# Patient Record
Sex: MALE | Race: WHITE | Employment: FULL TIME | URBAN - METROPOLITAN AREA
[De-identification: names, ages, dates, MRNs, and addresses within clinical notes are randomized per-mention and may not be internally consistent; named-entity substitution may affect disease eponyms.]

---

## 2020-11-04 ENCOUNTER — HOSPITAL ENCOUNTER (EMERGENCY)
Age: 40
Discharge: HOME | End: 2020-11-04
Attending: EMERGENCY MEDICINE
Payer: COMMERCIAL

## 2020-11-04 ENCOUNTER — APPOINTMENT (OUTPATIENT)
Dept: GENERAL RADIOLOGY | Age: 40
End: 2020-11-04
Payer: COMMERCIAL

## 2020-11-04 VITALS
TEMPERATURE: 97.6 F | OXYGEN SATURATION: 97 % | SYSTOLIC BLOOD PRESSURE: 125 MMHG | HEART RATE: 87 BPM | WEIGHT: 242.29 LBS | HEIGHT: 72 IN | RESPIRATION RATE: 16 BRPM | BODY MASS INDEX: 32.82 KG/M2 | DIASTOLIC BLOOD PRESSURE: 70 MMHG

## 2020-11-04 LAB
A/G RATIO: 1.3 (ref 1.1–2.2)
ALBUMIN SERPL-MCNC: 4.2 G/DL (ref 3.4–5)
ALP BLD-CCNC: 61 U/L (ref 40–129)
ALT SERPL-CCNC: 53 U/L (ref 10–40)
ANION GAP SERPL CALCULATED.3IONS-SCNC: 10 MMOL/L (ref 3–16)
AST SERPL-CCNC: 40 U/L (ref 15–37)
BASOPHILS ABSOLUTE: 0.3 K/UL (ref 0–0.2)
BASOPHILS RELATIVE PERCENT: 3.7 %
BILIRUB SERPL-MCNC: 0.7 MG/DL (ref 0–1)
BUN BLDV-MCNC: 17 MG/DL (ref 7–20)
CALCIUM SERPL-MCNC: 9.4 MG/DL (ref 8.3–10.6)
CHLORIDE BLD-SCNC: 102 MMOL/L (ref 99–110)
CO2: 25 MMOL/L (ref 21–32)
CREAT SERPL-MCNC: 1.4 MG/DL (ref 0.9–1.3)
EOSINOPHILS ABSOLUTE: 0.1 K/UL (ref 0–0.6)
EOSINOPHILS RELATIVE PERCENT: 1.3 %
GFR AFRICAN AMERICAN: >60
GFR NON-AFRICAN AMERICAN: 56
GLOBULIN: 3.2 G/DL
GLUCOSE BLD-MCNC: 113 MG/DL (ref 70–99)
HCT VFR BLD CALC: 46.5 % (ref 40.5–52.5)
HEMOGLOBIN: 15.4 G/DL (ref 13.5–17.5)
LACTIC ACID, SEPSIS: 0.8 MMOL/L (ref 0.4–1.9)
LYMPHOCYTES ABSOLUTE: 1.3 K/UL (ref 1–5.1)
LYMPHOCYTES RELATIVE PERCENT: 15.9 %
MCH RBC QN AUTO: 29.8 PG (ref 26–34)
MCHC RBC AUTO-ENTMCNC: 33 G/DL (ref 31–36)
MCV RBC AUTO: 90.4 FL (ref 80–100)
MONOCYTES ABSOLUTE: 0.8 K/UL (ref 0–1.3)
MONOCYTES RELATIVE PERCENT: 10.2 %
NEUTROPHILS ABSOLUTE: 5.7 K/UL (ref 1.7–7.7)
NEUTROPHILS RELATIVE PERCENT: 68.9 %
PDW BLD-RTO: 12.2 % (ref 12.4–15.4)
PLATELET # BLD: 203 K/UL (ref 135–450)
PMV BLD AUTO: 7.9 FL (ref 5–10.5)
POTASSIUM SERPL-SCNC: 4.7 MMOL/L (ref 3.5–5.1)
RBC # BLD: 5.15 M/UL (ref 4.2–5.9)
SODIUM BLD-SCNC: 137 MMOL/L (ref 136–145)
TOTAL PROTEIN: 7.4 G/DL (ref 6.4–8.2)
WBC # BLD: 8.2 K/UL (ref 4–11)

## 2020-11-04 PROCEDURE — 87040 BLOOD CULTURE FOR BACTERIA: CPT

## 2020-11-04 PROCEDURE — 83605 ASSAY OF LACTIC ACID: CPT

## 2020-11-04 PROCEDURE — 96365 THER/PROPH/DIAG IV INF INIT: CPT

## 2020-11-04 PROCEDURE — 2500000003 HC RX 250 WO HCPCS: Performed by: EMERGENCY MEDICINE

## 2020-11-04 PROCEDURE — 80053 COMPREHEN METABOLIC PANEL: CPT

## 2020-11-04 PROCEDURE — 73130 X-RAY EXAM OF HAND: CPT

## 2020-11-04 PROCEDURE — 85025 COMPLETE CBC W/AUTO DIFF WBC: CPT

## 2020-11-04 PROCEDURE — 6370000000 HC RX 637 (ALT 250 FOR IP): Performed by: EMERGENCY MEDICINE

## 2020-11-04 PROCEDURE — 90471 IMMUNIZATION ADMIN: CPT | Performed by: EMERGENCY MEDICINE

## 2020-11-04 PROCEDURE — 99285 EMERGENCY DEPT VISIT HI MDM: CPT

## 2020-11-04 PROCEDURE — 36415 COLL VENOUS BLD VENIPUNCTURE: CPT

## 2020-11-04 PROCEDURE — 90715 TDAP VACCINE 7 YRS/> IM: CPT | Performed by: EMERGENCY MEDICINE

## 2020-11-04 PROCEDURE — 6360000002 HC RX W HCPCS: Performed by: EMERGENCY MEDICINE

## 2020-11-04 RX ORDER — CEPHALEXIN 500 MG/1
1000 CAPSULE ORAL ONCE
Status: COMPLETED | OUTPATIENT
Start: 2020-11-04 | End: 2020-11-04

## 2020-11-04 RX ORDER — SULFAMETHOXAZOLE AND TRIMETHOPRIM 800; 160 MG/1; MG/1
2 TABLET ORAL ONCE
Status: COMPLETED | OUTPATIENT
Start: 2020-11-04 | End: 2020-11-04

## 2020-11-04 RX ORDER — SULFAMETHOXAZOLE AND TRIMETHOPRIM 800; 160 MG/1; MG/1
2 TABLET ORAL 2 TIMES DAILY
Qty: 40 TABLET | Refills: 0 | Status: SHIPPED | OUTPATIENT
Start: 2020-11-04 | End: 2020-11-14

## 2020-11-04 RX ORDER — CLINDAMYCIN PHOSPHATE 600 MG/50ML
600 INJECTION INTRAVENOUS ONCE
Status: COMPLETED | OUTPATIENT
Start: 2020-11-04 | End: 2020-11-04

## 2020-11-04 RX ORDER — ACETAMINOPHEN 500 MG
1000 TABLET ORAL ONCE
Status: COMPLETED | OUTPATIENT
Start: 2020-11-04 | End: 2020-11-04

## 2020-11-04 RX ORDER — CEPHALEXIN 500 MG/1
500 CAPSULE ORAL 4 TIMES DAILY
Qty: 40 CAPSULE | Refills: 0 | Status: SHIPPED | OUTPATIENT
Start: 2020-11-04

## 2020-11-04 RX ADMIN — CEPHALEXIN 1000 MG: 500 CAPSULE ORAL at 21:19

## 2020-11-04 RX ADMIN — ACETAMINOPHEN 1000 MG: 500 TABLET ORAL at 21:01

## 2020-11-04 RX ADMIN — TETANUS TOXOID, REDUCED DIPHTHERIA TOXOID AND ACELLULAR PERTUSSIS VACCINE, ADSORBED 0.5 ML: 5; 2.5; 8; 8; 2.5 SUSPENSION INTRAMUSCULAR at 21:03

## 2020-11-04 RX ADMIN — CLINDAMYCIN IN 5 PERCENT DEXTROSE 600 MG: 12 INJECTION, SOLUTION INTRAVENOUS at 21:09

## 2020-11-04 RX ADMIN — SULFAMETHOXAZOLE AND TRIMETHOPRIM 2 TABLET: 800; 160 TABLET ORAL at 21:19

## 2020-11-04 ASSESSMENT — PAIN - FUNCTIONAL ASSESSMENT: PAIN_FUNCTIONAL_ASSESSMENT: 0-10

## 2020-11-04 ASSESSMENT — PAIN DESCRIPTION - PAIN TYPE
TYPE: ACUTE PAIN
TYPE: CHRONIC PAIN
TYPE: ACUTE PAIN

## 2020-11-04 ASSESSMENT — PAIN DESCRIPTION - LOCATION: LOCATION: HAND

## 2020-11-04 ASSESSMENT — PAIN DESCRIPTION - ORIENTATION
ORIENTATION: RIGHT

## 2020-11-04 ASSESSMENT — PAIN SCALES - GENERAL
PAINLEVEL_OUTOF10: 2

## 2020-11-04 ASSESSMENT — PAIN DESCRIPTION - DESCRIPTORS
DESCRIPTORS: ACHING
DESCRIPTORS: ACHING

## 2020-11-04 ASSESSMENT — PAIN DESCRIPTION - FREQUENCY: FREQUENCY: CONTINUOUS

## 2020-11-04 ASSESSMENT — PAIN DESCRIPTION - DIRECTION: RADIATING_TOWARDS: RIGHT MIDDLE FINGER

## 2020-11-05 NOTE — ED NOTES
Gave patient discharge instructions. He states,understanding.  Patient discharged to home      Naeem Chan RN  11/04/20 2985

## 2020-11-05 NOTE — ED NOTES
1 blood culture obtained by Nicole Esteban     2 nd blood culture obtained by Elenita Del Castillo RN  11/04/20 3089

## 2020-11-05 NOTE — ED PROVIDER NOTES
[facility]  eMERGENCY dEPARTMENT eNCOUnter      Pt Name: Anise Phalen  MRN: [id number]  [place] 1980  Date of evaluation: 11/4/2020  Provider: Richardson Grimaldo MD    CHIEF COMPLAINT       Chief Complaint   Patient presents with    Other     patient states right middle finger swollen on Saturday. He stuck a pin in finger to drain small amont of pus out of finger. He states, he has been squeezing on it everyday trying to drain any pus out of it. Today he reports red arm going up his right arm          CRITICAL CARE TIME   Total Critical Care time was 0 minutes, excluding separately reportable procedures. There was a high probability of clinically significant/life threatening deterioration in the patient's condition which required my urgent intervention. HISTORY OF PRESENT ILLNESS  (Location/Symptom, Timing/Onset, Context/Setting, Quality, Duration, Modifying Factors, Severity.)   Anise Phalen is a 41 y.o. male who presents to the emergency department complaining of pain and swelling in his right middle finger with some redness in his right arm. About 3 days ago he noticed what looked like a small pustule on his right middle finger. He states he took a needle and popped it. He had some drainage. He states today he noticed some redness in his right arm and thought it might still be related. He said he still has some swelling in his right middle finger. 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 He does not remember injuring himself. He does not remember getting any foreign body or laceration. Nursing Notes were reviewed and I agree. REVIEW OF SYSTEMS    (2-9 systems for level 4, 10 or more for level 5)     Dental: No fever or chills. Cardiovascular: No chest pain. Pulmonary: No shortness of breath. GI: No abdominal pain nausea vomiting.   Musculoskeletal: Swelling in his right middle finger, pustule that he popped with a needle several days ago. Some redness in his right upper arm and forearm area. Skin: As above. Except as noted above the remainder of the review of systems was reviewed and negative. PAST MEDICAL HISTORY   History reviewed. No pertinent past medical history. SURGICAL HISTORY       Past Surgical History:   Procedure Laterality Date    WISDOM TOOTH EXTRACTION           CURRENT MEDICATIONS       Previous Medications    No medications on file       ALLERGIES     Patient has no known allergies. FAMILY HISTORY     History reviewed. No pertinent family history.        SOCIAL HISTORY       Social History     Socioeconomic History    Marital status:      Spouse name: None    Number of children: None    Years of education: None    Highest education level: None   Occupational History    None   Social Needs    Financial resource strain: None    Food insecurity     Worry: None     Inability: None    Transportation needs     Medical: None     Non-medical: None   Tobacco Use    Smoking status: Never Smoker    Smokeless tobacco: Never Used   Substance and Sexual Activity    Alcohol use: No    Drug use: No    Sexual activity: Yes     Partners: Female   Lifestyle    Physical activity     Days per week: None     Minutes per session: None    Stress: None   Relationships    Social connections     Talks on phone: None     Gets together: None     Attends Yarsanism service: None     Active member of club or organization: None     Attends meetings of clubs or organizations: None     Relationship status: None    Intimate partner violence     Fear of current or ex partner: None     Emotionally abused: None     Physically abused: None     Forced sexual activity: None   Other Topics Concern    None   Social History Narrative    None         PHYSICAL EXAM    (up to 7 for level 4, 8 or more for level 5)     ED Triage Vitals   BP Temp Temp src Pulse Resp SpO2 Height Weight   -- -- -- -- -- -- -- -- General: Alert white male no acute distress. Head: Atraumatic and normocephalic. Eyes: No conjunctival injection. Pupils equal round reactive. ENT: To Curls is clear. Oropharynx is moist without erythema. Neck: Supple without adenopathy, nontender. Heart: Regular rate and rhythm. No murmurs or gallops noted. Lungs: Breath sounds equal bilaterally and clear. Abdomen: Soft, nondistended, nontender. No masses organomegaly. Musculoskeletal: There are some mild diffuse swelling of the proximal and middle phalanx of the right middle finger primarily on the palm side. The skin is diffusely dry and cracked over his entire hand. There is no pustule. No fluctuance. No significant tenderness along the flexor tendon into the palm. He has full range of motion of the digits including intact flexion and extension with no significant pain. Some lymphangitis on his forearm and biceps area. Full range of motion of his elbow and wrist.  Intact distal pulses and capillary refill. Neuro: Awake, alert, oriented. Symmetrical reactive pupils. Intact extraocular movements. No facial asymmetry. Symmetrical motor function. Normal gait. DIFFERENTIAL DIAGNOSIS   Differential includes but is not limited to finger abscess, cellulitis, tenosynovitis, lymphangitis, sepsis      DIAGNOSTIC RESULTS     EKG: All EKG's are interpreted by Ofe Delong MD in the absence of a cardiologist.      RADIOLOGY:   Non-plain film images such as CT, Ultrasound and MRI are read by the radiologist. Plain radiographic images are visualized and preliminarily interpreted Ofe Delong MD with the below findings:      Interpretation per the Radiologist below, if available at the time of this note:    XR HAND RIGHT (MIN 3 VIEWS)   Final Result   Right middle finger soft tissue swelling, likely cellulitis. No evidence of   osteomyelitis. No evidence of a radiopaque foreign body.                ED BEDSIDE ULTRASOUND:   Performed by ED Physician - none    LABS:  Labs Reviewed   CBC WITH AUTO DIFFERENTIAL - Abnormal; Notable for the following components:       Result Value    RDW 12.2 (*)     Basophils Absolute 0.3 (*)     All other components within normal limits    Narrative:     Performed at:  [facility]) [facility]  [address]   Phone [phone]   COMPREHENSIVE METABOLIC PANEL - Abnormal; Notable for the following components:    Glucose 113 (*)     CREATININE 1.4 (*)     GFR Non- 56 (*)     ALT 53 (*)     AST 40 (*)     All other components within normal limits    Narrative:     Performed at:  [place] [id number] [facility]   Phone [phone]   CULTURE, BLOOD 1   CULTURE, BLOOD 2   LACTATE, SEPSIS    Narrative:     Performed at:  [place] [id number] [facility]   Phone [phone]       All other labs were within normal range or not returned as of this dictation. EMERGENCY DEPARTMENT COURSE and DIFFERENTIAL DIAGNOSIS/MDM:   Vitals:    Vitals:    11/04/20 2001   BP: 131/76   Pulse: 92   Resp: 16   Temp: 101.5 °F (38.6 °C)   TempSrc: Oral   SpO2: 95%   Weight: 242 lb 4.6 oz (109.9 kg)   Height: 6' (1.829 m)       This patient reportedly had an area on his right middle finger that he punctured with a needle to drain some purulent material out of it. Since then he has had some continued swelling. He is developed some redness in his right forearm and upper arm. His swelling in his finger is primarily in area of the proximal middle phalanx, it somewhat diffuse. He has no significant tenderness along the necks or extensor tendon. He moves the finger well, he clinically does not have a tenosynovitis. The tenderness is not significant along the flexor or extensor tendons.   He has some lymphangitis in his forearm and bicep area is seen in the pictures. He was febrile at 101. White blood cell count is not significantly elevated, no significant shift. His lactic acid is not elevated. Blood cultures were obtained. He was given a dose of IV clindamycin. Clinically I suspect this is cellulitis at this point in time. I am going to put him on Bactrim and Keflex. I am going to have him follow-up with hand surgery. We discussed the possibility of worsening infection including complications like tenosynovitis. We discussed returning for persistent fever, increased redness, swelling, pain. We discussed the fact that if he is not improved or has continued fever in 48 to 72 hours he should return here so that I can reevaluate and decide if he needs inpatient treatment. CONSULTS:  None    PROCEDURES:  None    FINAL IMPRESSION      1.  Cellulitis of finger of right hand          DISPOSITION/PLAN   DISPOSITION        PATIENT REFERRED TO:  Ginna Mc MD  [address] [facility] [address] [facility]  [phone]    In 5 days        DISCHARGE MEDICATIONS:  New Prescriptions    CEPHALEXIN (KEFLEX) 500 MG CAPSULE    Take 1 capsule by mouth 4 times daily    SULFAMETHOXAZOLE-TRIMETHOPRIM (BACTRIM DS) 800-160 MG PER TABLET    Take 2 tablets by mouth 2 times daily for 10 days       (Please note that portions of this note were completed with a voice recognition program.  Efforts were made to edit the dictations but occasionally words are mis-transcribed.)    Josey Lovelace MD  Attending Emergency Physician        Josue Gomez MD  11/04/20 6433

## 2020-11-09 LAB
BLOOD CULTURE, ROUTINE: NORMAL
CULTURE, BLOOD 2: NORMAL

## 2022-12-13 ENCOUNTER — OFFICE VISIT (OUTPATIENT)
Dept: SURGERY | Age: 42
End: 2022-12-13
Payer: COMMERCIAL

## 2022-12-13 VITALS — DIASTOLIC BLOOD PRESSURE: 80 MMHG | SYSTOLIC BLOOD PRESSURE: 110 MMHG | BODY MASS INDEX: 32.86 KG/M2 | HEIGHT: 72 IN

## 2022-12-13 DIAGNOSIS — K40.90 LEFT INGUINAL HERNIA: Primary | ICD-10-CM

## 2022-12-13 PROCEDURE — G8484 FLU IMMUNIZE NO ADMIN: HCPCS | Performed by: SURGERY

## 2022-12-13 PROCEDURE — G8417 CALC BMI ABV UP PARAM F/U: HCPCS | Performed by: SURGERY

## 2022-12-13 PROCEDURE — G8427 DOCREV CUR MEDS BY ELIG CLIN: HCPCS | Performed by: SURGERY

## 2022-12-13 PROCEDURE — 99204 OFFICE O/P NEW MOD 45 MIN: CPT | Performed by: SURGERY

## 2022-12-13 PROCEDURE — 4004F PT TOBACCO SCREEN RCVD TLK: CPT | Performed by: SURGERY

## 2022-12-13 ASSESSMENT — ENCOUNTER SYMPTOMS
ABDOMINAL PAIN: 1
ABDOMINAL DISTENTION: 1

## 2022-12-13 NOTE — PATIENT INSTRUCTIONS
Hernia repair scheduled for 1/18/23 at 10:20 arrive at 8:50. Nothing to eat or drink after midnight. You will need someone to bring you home.

## 2022-12-13 NOTE — PROGRESS NOTES
Christy Thorne (:  1980) is a 38 y.o. male,New patient, here for evaluation of the following chief complaint(s):  Surgical Consult (Pt is here today, referred by a friend, for a possible left inguinal hernia. )         ASSESSMENT/PLAN:  1. Left inguinal hernia    Repair    The risks, benefits and alternatives to the planned procedure were discussed. Patient expressed an understanding and is willing to proceed. Subjective   SUBJECTIVE/OBJECTIVE:  HPI  Chief Complaint: hernia    Patient presents for evaluation of a hernia. Patient reports symptoms of bulging with exertion, occasional pain and a \"gurgling sound\" at times. Location of symptoms is left groin. Symptoms were first noted a few years ago but have worsened over the past few months. Patient has a history of no other surgical issues. Will plan following treatment: repair in January.       PMH: covid in     Past Surgical History:   Procedure Laterality Date    WISDOM TOOTH EXTRACTION         Medication: none    Prior to Admission medications    Not on File         No Known Allergies    Social History     Socioeconomic History    Marital status:      Spouse name: Not on file    Number of children: Not on file    Years of education: Not on file    Highest education level: Not on file   Occupational History    Not on file   Tobacco Use    Smoking status: Never    Smokeless tobacco: Never   Substance and Sexual Activity    Alcohol use: No    Drug use: No    Sexual activity: Yes     Partners: Female   Other Topics Concern    Not on file   Social History Narrative    Not on file     Social Determinants of Health     Financial Resource Strain: Not on file   Food Insecurity: Not on file   Transportation Needs: Not on file   Physical Activity: Not on file   Stress: Not on file   Social Connections: Not on file   Intimate Partner Violence: Not on file   Housing Stability: Not on file       Family History: reviewed and not contributory to current medical issues      Review of Systems   Gastrointestinal:  Positive for abdominal distention and abdominal pain. All other systems reviewed and are negative. Objective   Physical Exam  Constitutional:       General: He is not in acute distress. Appearance: He is well-developed. He is not toxic-appearing. HENT:      Head: Normocephalic and atraumatic. Neck:      Thyroid: No thyromegaly. Trachea: No tracheal deviation. Cardiovascular:      Heart sounds: Normal heart sounds. No murmur heard. Pulmonary:      Effort: Pulmonary effort is normal. No respiratory distress. Breath sounds: Normal breath sounds. Abdominal:      General: There is no distension. Palpations: Abdomen is soft. Tenderness: There is no abdominal tenderness. There is no guarding. Hernia: A hernia (left groin) is present. Musculoskeletal:         General: No tenderness. Normal range of motion. Cervical back: Neck supple. Skin:     General: Skin is warm and dry. Neurological:      Mental Status: He is alert and oriented to person, place, and time. Cranial Nerves: No cranial nerve deficit. Psychiatric:         Behavior: Behavior normal.         Thought Content: Thought content normal.   Vitals    Last recorded: 12/13 1133     BP:  110/80     Height:  6' (1.829 m)            Electronically signed by Trish Snider MD on 12/13/2022 at 11:57 AM        An electronic signature was used to authenticate this note.     --Trish Snider MD

## 2023-01-10 NOTE — PROGRESS NOTES
C-diff Questionnaire:     * Admitted with diarrhea? [] YES    [x]  NO     *Prior history of C-Diff. In last 3 months? [] YES    [x]  NO     *Antibiotic use in the past 6-8 weeks? [x]  NO    []  YES      If yes, which: REASON_________________     *Prior hospitalization or nursing home in the last month? []  YES    [x]  NO     SAFETY FIRST. .call before you fall    [address] time__845       Surgery time__1020    Do not eat or drink anything after 12:00 midnight prior to your surgery. This includes water chewing gum, mints and ice chips- the Day of Surgery. You may brush your teeth and gargle the morning of your surgery, but do not swallow the water     Please see your family doctor/pediatrician for a history and physical and/or questions concerning medications. Bring any test results/reports from your physicians office. If you are under the care of a heart doctor or specialist doctor, please be aware that you may be asked to them for clearance    You may be asked to stop blood thinners such as Coumadin, Plavix, Fragmin, Lovenox, etc., or any anti-inflammatories such as:  Aspirin, Ibuprofen, Advil, Naproxen prior to your surgery. We also ask that you stop any OTC medications such as fish oil, vitamin E, glucosamine, garlic, Multivitamins, COQ 10, etc.    We ask that you do not smoke 24 hours prior to surgery  We ask that you do not  drink any alcoholic beverages 24 hours prior to surgery     You must make arrangements for a responsible adult to take you home after your surgery. For your safety you will not be allowed to leave alone or drive yourself home. Your surgery will be cancelled if you do not have a ride home. Also for your safety, it is strongly suggested that someone stay with you the first 24 hours after your surgery.      A parent or legal guardian must accompany a child scheduled for surgery and plan to stay at the hospital until the child is discharged. Please do not bring other children with you. For your comfort, please wear simple loose fitting clothing to the hospital.  Please do not bring valuables. Do not wear any make-up or nail polish on your fingers or toes. For your safety, please do not wear any jewelry or body piercing's on the day of surgery. All jewelry must be removed. If you have dentures, they will be removed before going to operating room. For your convenience, we will provide you with a container. If you wear contact lenses or glasses, they will be removed, please bring a case for them. If you have a living will and a durable power of  for healthcare, please bring in a copy. As part of our patient safety program to minimize surgical site infections, we ask you to do the following:    Please notify your surgeon if you develop any illness between         now and the day of your surgery. This includes a cough, cold, fever, sore throat, nausea,         or vomiting, and diarrhea, etc.   Please notify your surgeon if you experience dizziness, shortness         of breath or blurred vision between now and the time of your surgery. Do not shave your operative site 96 hours prior to surgery. For face and neck surgery, men may use an electric razor 48 hours   prior to surgery. You may shower the night before surgery or the morning of   your surgery with an antibacterial soap. You will need to bring a photo ID and insurance card     If you use a C-pap or Bi-pap machine, please bring your machine with you to the hospital     Our goal is to provide you with excellent care, therefore, visitors will be limited to so that we may focus on providing this care for you. Please contact your surgeon office, if you have any further questions.                  [facility] phone number:  [address] PAT fax number:  [phone]    Please note these are generalized instructions for all surgical cases, you may be provided with more specific instructions according to your surgery.

## 2023-01-17 ENCOUNTER — ANESTHESIA EVENT (OUTPATIENT)
Dept: OPERATING ROOM | Age: 43
End: 2023-01-17
Payer: COMMERCIAL

## 2023-01-18 ENCOUNTER — ANESTHESIA (OUTPATIENT)
Dept: OPERATING ROOM | Age: 43
End: 2023-01-18
Payer: COMMERCIAL

## 2023-01-18 ENCOUNTER — HOSPITAL ENCOUNTER (OUTPATIENT)
Age: 43
Setting detail: OUTPATIENT SURGERY
Discharge: HOME | End: 2023-01-18
Attending: SURGERY | Admitting: SURGERY
Payer: COMMERCIAL

## 2023-01-18 VITALS
WEIGHT: 242 LBS | HEIGHT: 72 IN | SYSTOLIC BLOOD PRESSURE: 126 MMHG | DIASTOLIC BLOOD PRESSURE: 79 MMHG | OXYGEN SATURATION: 96 % | TEMPERATURE: 97.6 F | BODY MASS INDEX: 32.78 KG/M2 | HEART RATE: 66 BPM | RESPIRATION RATE: 18 BRPM

## 2023-01-18 DIAGNOSIS — K40.90 UNILATERAL INGUINAL HERNIA WITHOUT OBSTRUCTION OR GANGRENE, RECURRENCE NOT SPECIFIED: ICD-10-CM

## 2023-01-18 PROCEDURE — 2709999900 HC NON-CHARGEABLE SUPPLY: Performed by: SURGERY

## 2023-01-18 PROCEDURE — 3600000013 HC SURGERY LEVEL 3 ADDTL 15MIN: Performed by: SURGERY

## 2023-01-18 PROCEDURE — C1781 MESH (IMPLANTABLE): HCPCS | Performed by: SURGERY

## 2023-01-18 PROCEDURE — 6360000002 HC RX W HCPCS: Performed by: NURSE ANESTHETIST, CERTIFIED REGISTERED

## 2023-01-18 PROCEDURE — 2580000003 HC RX 258: Performed by: ANESTHESIOLOGY

## 2023-01-18 PROCEDURE — 7100000001 HC PACU RECOVERY - ADDTL 15 MIN: Performed by: SURGERY

## 2023-01-18 PROCEDURE — 49505 PRP I/HERN INIT REDUC >5 YR: CPT | Performed by: SURGERY

## 2023-01-18 PROCEDURE — 6360000002 HC RX W HCPCS: Performed by: SURGERY

## 2023-01-18 PROCEDURE — 2580000003 HC RX 258: Performed by: SURGERY

## 2023-01-18 PROCEDURE — A4217 STERILE WATER/SALINE, 500 ML: HCPCS | Performed by: SURGERY

## 2023-01-18 PROCEDURE — 2500000003 HC RX 250 WO HCPCS: Performed by: SURGERY

## 2023-01-18 PROCEDURE — 7100000000 HC PACU RECOVERY - FIRST 15 MIN: Performed by: SURGERY

## 2023-01-18 PROCEDURE — C2626 INFUSION PUMP, NON-PROG,TEMP: HCPCS | Performed by: SURGERY

## 2023-01-18 PROCEDURE — 7100000011 HC PHASE II RECOVERY - ADDTL 15 MIN: Performed by: SURGERY

## 2023-01-18 PROCEDURE — 3600000003 HC SURGERY LEVEL 3 BASE: Performed by: SURGERY

## 2023-01-18 PROCEDURE — 7100000010 HC PHASE II RECOVERY - FIRST 15 MIN: Performed by: SURGERY

## 2023-01-18 PROCEDURE — 2500000003 HC RX 250 WO HCPCS: Performed by: NURSE ANESTHETIST, CERTIFIED REGISTERED

## 2023-01-18 PROCEDURE — 3700000001 HC ADD 15 MINUTES (ANESTHESIA): Performed by: SURGERY

## 2023-01-18 PROCEDURE — 3700000000 HC ANESTHESIA ATTENDED CARE: Performed by: SURGERY

## 2023-01-18 PROCEDURE — 6370000000 HC RX 637 (ALT 250 FOR IP): Performed by: ANESTHESIOLOGY

## 2023-01-18 DEVICE — MESH HERN W3XL6IN INGUINAL POLYPR MFIL RECTANG: Type: IMPLANTABLE DEVICE | Site: GROIN | Status: FUNCTIONAL

## 2023-01-18 RX ORDER — FENTANYL CITRATE 50 UG/ML
25 INJECTION, SOLUTION INTRAMUSCULAR; INTRAVENOUS EVERY 5 MIN PRN
Status: DISCONTINUED | OUTPATIENT
Start: 2023-01-18 | End: 2023-01-18 | Stop reason: HOSPADM

## 2023-01-18 RX ORDER — ONDANSETRON 2 MG/ML
INJECTION INTRAMUSCULAR; INTRAVENOUS PRN
Status: DISCONTINUED | OUTPATIENT
Start: 2023-01-18 | End: 2023-01-18 | Stop reason: SDUPTHER

## 2023-01-18 RX ORDER — SODIUM CHLORIDE 0.9 % (FLUSH) 0.9 %
5-40 SYRINGE (ML) INJECTION PRN
Status: DISCONTINUED | OUTPATIENT
Start: 2023-01-18 | End: 2023-01-18 | Stop reason: HOSPADM

## 2023-01-18 RX ORDER — NAPROXEN 500 MG/1
500 TABLET ORAL 2 TIMES DAILY WITH MEALS
Qty: 30 TABLET | Refills: 0 | Status: SHIPPED | OUTPATIENT
Start: 2023-01-18

## 2023-01-18 RX ORDER — FENTANYL CITRATE 50 UG/ML
INJECTION, SOLUTION INTRAMUSCULAR; INTRAVENOUS PRN
Status: DISCONTINUED | OUTPATIENT
Start: 2023-01-18 | End: 2023-01-18 | Stop reason: SDUPTHER

## 2023-01-18 RX ORDER — MAGNESIUM HYDROXIDE 1200 MG/15ML
LIQUID ORAL CONTINUOUS PRN
Status: DISCONTINUED | OUTPATIENT
Start: 2023-01-18 | End: 2023-01-18 | Stop reason: HOSPADM

## 2023-01-18 RX ORDER — KETAMINE HCL IN NACL, ISO-OSM 100MG/10ML
SYRINGE (ML) INJECTION PRN
Status: DISCONTINUED | OUTPATIENT
Start: 2023-01-18 | End: 2023-01-18 | Stop reason: SDUPTHER

## 2023-01-18 RX ORDER — PROPOFOL 10 MG/ML
INJECTION, EMULSION INTRAVENOUS CONTINUOUS PRN
Status: DISCONTINUED | OUTPATIENT
Start: 2023-01-18 | End: 2023-01-18 | Stop reason: SDUPTHER

## 2023-01-18 RX ORDER — HYDROCODONE BITARTRATE AND ACETAMINOPHEN 5; 325 MG/1; MG/1
1 TABLET ORAL EVERY 6 HOURS PRN
Qty: 28 TABLET | Refills: 0 | Status: SHIPPED | OUTPATIENT
Start: 2023-01-18 | End: 2023-01-23

## 2023-01-18 RX ORDER — HYDROCODONE BITARTRATE AND ACETAMINOPHEN 5; 325 MG/1; MG/1
1 TABLET ORAL
Status: COMPLETED | OUTPATIENT
Start: 2023-01-18 | End: 2023-01-18

## 2023-01-18 RX ORDER — BUPIVACAINE HYDROCHLORIDE 5 MG/ML
INJECTION, SOLUTION EPIDURAL; INTRACAUDAL
Status: COMPLETED | OUTPATIENT
Start: 2023-01-18 | End: 2023-01-18

## 2023-01-18 RX ORDER — SODIUM CHLORIDE 0.9 % (FLUSH) 0.9 %
5-40 SYRINGE (ML) INJECTION EVERY 12 HOURS SCHEDULED
Status: DISCONTINUED | OUTPATIENT
Start: 2023-01-18 | End: 2023-01-18 | Stop reason: HOSPADM

## 2023-01-18 RX ORDER — SODIUM CHLORIDE 9 MG/ML
INJECTION, SOLUTION INTRAVENOUS PRN
Status: DISCONTINUED | OUTPATIENT
Start: 2023-01-18 | End: 2023-01-18 | Stop reason: HOSPADM

## 2023-01-18 RX ORDER — PROPOFOL 10 MG/ML
INJECTION, EMULSION INTRAVENOUS PRN
Status: DISCONTINUED | OUTPATIENT
Start: 2023-01-18 | End: 2023-01-18 | Stop reason: SDUPTHER

## 2023-01-18 RX ORDER — ONDANSETRON 2 MG/ML
4 INJECTION INTRAMUSCULAR; INTRAVENOUS
Status: DISCONTINUED | OUTPATIENT
Start: 2023-01-18 | End: 2023-01-18 | Stop reason: HOSPADM

## 2023-01-18 RX ORDER — LIDOCAINE HYDROCHLORIDE 10 MG/ML
INJECTION, SOLUTION EPIDURAL; INFILTRATION; INTRACAUDAL; PERINEURAL
Status: COMPLETED | OUTPATIENT
Start: 2023-01-18 | End: 2023-01-18

## 2023-01-18 RX ORDER — MIDAZOLAM HYDROCHLORIDE 1 MG/ML
INJECTION INTRAMUSCULAR; INTRAVENOUS PRN
Status: DISCONTINUED | OUTPATIENT
Start: 2023-01-18 | End: 2023-01-18 | Stop reason: SDUPTHER

## 2023-01-18 RX ORDER — GLYCOPYRROLATE 0.2 MG/ML
INJECTION INTRAMUSCULAR; INTRAVENOUS PRN
Status: DISCONTINUED | OUTPATIENT
Start: 2023-01-18 | End: 2023-01-18 | Stop reason: SDUPTHER

## 2023-01-18 RX ORDER — LIDOCAINE HYDROCHLORIDE 20 MG/ML
INJECTION, SOLUTION EPIDURAL; INFILTRATION; INTRACAUDAL; PERINEURAL PRN
Status: DISCONTINUED | OUTPATIENT
Start: 2023-01-18 | End: 2023-01-18 | Stop reason: SDUPTHER

## 2023-01-18 RX ADMIN — GLYCOPYRROLATE 0.2 MG: 0.2 INJECTION, SOLUTION INTRAMUSCULAR; INTRAVENOUS at 10:30

## 2023-01-18 RX ADMIN — ONDANSETRON 4 MG: 2 INJECTION INTRAMUSCULAR; INTRAVENOUS at 11:04

## 2023-01-18 RX ADMIN — Medication 15 MG: at 10:34

## 2023-01-18 RX ADMIN — MIDAZOLAM 2 MG: 1 INJECTION INTRAMUSCULAR; INTRAVENOUS at 10:03

## 2023-01-18 RX ADMIN — FENTANYL CITRATE 25 MCG: 50 INJECTION INTRAMUSCULAR; INTRAVENOUS at 10:15

## 2023-01-18 RX ADMIN — Medication 15 MG: at 10:08

## 2023-01-18 RX ADMIN — CEFAZOLIN 2000 MG: 2 INJECTION, POWDER, FOR SOLUTION INTRAMUSCULAR; INTRAVENOUS at 10:03

## 2023-01-18 RX ADMIN — FENTANYL CITRATE 25 MCG: 50 INJECTION INTRAMUSCULAR; INTRAVENOUS at 10:24

## 2023-01-18 RX ADMIN — FENTANYL CITRATE 25 MCG: 50 INJECTION INTRAMUSCULAR; INTRAVENOUS at 10:08

## 2023-01-18 RX ADMIN — SODIUM CHLORIDE: 9 INJECTION, SOLUTION INTRAVENOUS at 10:05

## 2023-01-18 RX ADMIN — PROPOFOL 200 MCG/KG/MIN: 10 INJECTION, EMULSION INTRAVENOUS at 10:08

## 2023-01-18 RX ADMIN — PROPOFOL 80 MG: 10 INJECTION, EMULSION INTRAVENOUS at 10:08

## 2023-01-18 RX ADMIN — HYDROCODONE BITARTRATE AND ACETAMINOPHEN 1 TABLET: 5; 325 TABLET ORAL at 12:53

## 2023-01-18 RX ADMIN — FENTANYL CITRATE 25 MCG: 50 INJECTION INTRAMUSCULAR; INTRAVENOUS at 10:31

## 2023-01-18 RX ADMIN — LIDOCAINE HYDROCHLORIDE 100 MG: 20 INJECTION, SOLUTION EPIDURAL; INFILTRATION; INTRACAUDAL; PERINEURAL at 10:05

## 2023-01-18 ASSESSMENT — PAIN DESCRIPTION - ORIENTATION
ORIENTATION: LEFT
ORIENTATION: RIGHT
ORIENTATION: LEFT

## 2023-01-18 ASSESSMENT — PAIN DESCRIPTION - LOCATION
LOCATION: GROIN

## 2023-01-18 ASSESSMENT — PAIN - FUNCTIONAL ASSESSMENT
PAIN_FUNCTIONAL_ASSESSMENT: 0-10
PAIN_FUNCTIONAL_ASSESSMENT: PREVENTS OR INTERFERES SOME ACTIVE ACTIVITIES AND ADLS
PAIN_FUNCTIONAL_ASSESSMENT: PREVENTS OR INTERFERES SOME ACTIVE ACTIVITIES AND ADLS
PAIN_FUNCTIONAL_ASSESSMENT: ACTIVITIES ARE NOT PREVENTED

## 2023-01-18 ASSESSMENT — PAIN SCALES - GENERAL
PAINLEVEL_OUTOF10: 0
PAINLEVEL_OUTOF10: 0
PAINLEVEL_OUTOF10: 2
PAINLEVEL_OUTOF10: 2
PAINLEVEL_OUTOF10: 4

## 2023-01-18 ASSESSMENT — PAIN DESCRIPTION - PAIN TYPE
TYPE: SURGICAL PAIN

## 2023-01-18 ASSESSMENT — PAIN DESCRIPTION - ONSET
ONSET: ON-GOING

## 2023-01-18 ASSESSMENT — PAIN DESCRIPTION - FREQUENCY
FREQUENCY: INTERMITTENT
FREQUENCY: INTERMITTENT
FREQUENCY: CONTINUOUS

## 2023-01-18 ASSESSMENT — PAIN DESCRIPTION - DESCRIPTORS
DESCRIPTORS: ACHING

## 2023-01-18 NOTE — H&P
PATIENT NAME: Cyrus Abbott   YOB: 1980    ADMISSION DATE: 1/18/2023  8:37 AM      TODAY'S DATE: 1/18/2023    HPI  Chief Complaint: hernia     Patient presents for evaluation of a hernia. Patient reports symptoms of bulging with exertion, occasional pain and a \"gurgling sound\" at times. Location of symptoms is left groin. Symptoms were first noted a few years ago but have worsened over the past few months. Patient has a history of no other surgical issues. Will plan following treatment: repair. The risks, benefits and alternatives to the planned procedure were discussed. Patient expressed an understanding and is willing to proceed. Past Medical History:        Diagnosis Date    COVID-19     2022    Hernia, inguinal        Past Surgical History:        Procedure Laterality Date    WISDOM TOOTH EXTRACTION         Medications Prior to Admission:   No medications prior to admission. Allergies:  Percocet [oxycodone-acetaminophen]    Social History:   TOBACCO:   reports that he has never smoked. He has never used smokeless tobacco.  ETOH:   reports no history of alcohol use. DRUGS:   reports no history of drug use. Family History:   Reviewed and non contributory to the current clinical condition    REVIEW OF SYSTEMS:    CONSTITUTIONAL:  negative  HEENT:  negative  CARDIOVASCULAR:  negative  GASTROINTESTINAL:  positive for abdominal pain  GENITOURINARY:  negative  HEMATOLOGIC/LYMPHATIC:  negative  ENDOCRINE:  negative  All other systems negative    PHYSICAL EXAM:    VITALS:  BP (!) 140/75   Pulse 81   Temp 96.9 °F (36.1 °C) (Temporal)   Resp 18   Ht 6' (1.829 m)   Wt 242 lb (109.8 kg)   SpO2 99%   BMI 32.82 kg/m²   INTAKE/OUTPUT:   No intake/output data recorded. No intake/output data recorded.   CONSTITUTIONAL:  awake, alert, no apparent distress and mildly obese  ENT:  normocepalic, without obvious abnormality, no thyromegaly or masses  NECK:  supple, symmetrical, trachea midline LUNGS:  clear to auscultation, no crackles or wheezing  CARDIOVASCULAR:  regular rate and rhythm and no murmur noted  ABDOMEN:  soft, non distended, tender left inguinal hernia  LYMPHATIC: no cervical or inguinal adenopathy  MUSCULOSKELETAL:  0+ pitting edema lower extremities, no deformities, no tenderness  NEUROLOGIC:  Mental Status Exam:  Level of Alertness:   awake. Cranial nerves 2-12 intact  Orientation:   person, place, time      ASSESSMENT AND PLAN:    Left inguinal hernia  For repair today  The risks, benefits and alternatives to the planned procedure were discussed. Patient expressed an understanding and is willing to proceed.     Electronically signed by Valencia Guido MD on 1/18/2023 at 10:07 AM      Valencia Guido MD

## 2023-01-18 NOTE — PROGRESS NOTES
Patient arrived to phase 2. States pain 2/10 but tolerable. Dressing clean dry and intact.      Electronically signed by Jesenia Govea RN on 1/18/2023 at 12:25 PM

## 2023-01-18 NOTE — BRIEF OP NOTE
Brief Postoperative Note      Patient: Serenity Truong  YOB: 1980  MRN: [id number]    Date of Procedure: 1/18/2023    Pre-Op Diagnosis: LEFT INGUINAL HERNIA    Post-Op Diagnosis: Same       Procedure(s):  LEFT INGUINAL HERNIA REPAIR WITH MESH ON Q PUMP    Surgeon(s):  Jaylen Rodriguez MD    Assistant:  Surgical Assistant: Radford Mayhew; Alonna Apley    Anesthesia: Monitor Anesthesia Care    Estimated Blood Loss (mL): less than 50     Complications: None    Specimens:   ID Type Source Tests Collected by Time Destination   A : A) LEFT INGUINAL HERNIA [facility] SURGICAL PATHOLOGY Jaylen Rodriguez MD 1/18/2023 1021        Implants:  Implant Name Type Inv.  Item Serial No.  Lot No. LRB No. Used Action   MESH ERICA [id number] INGUINAL POLYPR [employer] [place] - [id number]  [id number] POLYPR M[employer]ANG  BARD DAVOL-WD [id number] Left 1 Implanted         Drains: * No LDAs found *    Findings: indirect hernia    Electronically signed by Sharon Moreland MD on 1/18/2023 at 11:09 AM

## 2023-01-18 NOTE — PROGRESS NOTES
Patient admitted to PACU # 5 from OR at 1115 post LEFT INGUINAL HERNIA REPAIR WITH MESH ON Q PUMP - Left per MD Emil Lang. Attached to PACU monitoring system and report received from anesthesia provider. Patient was reported to be hemodynamically stable during procedure. Patient sleeping, oral airway in place. Dressing is clean, dry, and intact. Qball in place and unclamped. Will continue to monitor.

## 2023-01-18 NOTE — ANESTHESIA PRE PROCEDURE
[facility] Department of Anesthesiology  Pre-Anesthesia Evaluation/Consultation       Name:  Sheba Jamil  : 1980  Age:  38 y. o. MRN:  [id number]  Date: 2023           Surgeon: Surgeon(s):  Wilbert Thao MD    Procedure: Procedure(s):  LEFT INGUINAL HERNIA REPAIR WITH MESH ON Q PUMP     Allergies   Allergen Reactions    Percocet [Oxycodone-Acetaminophen] Nausea And Vomiting     There is no problem list on file for this patient. Past Medical History:   Diagnosis Date    COVID-19     2022    Hernia, inguinal      Past Surgical History:   Procedure Laterality Date    WISDOM TOOTH EXTRACTION       Social History     Tobacco Use    Smoking status: Never    Smokeless tobacco: Never   Vaping Use    Vaping Use: Never used   Substance Use Topics    Alcohol use: No    Drug use: No     Medications  No current facility-administered medications on file prior to encounter. No current outpatient medications on file prior to encounter.      Current Facility-Administered Medications   Medication Dose Route Frequency Provider Last Rate Last Admin    sodium chloride flush 0.9 % injection 5-40 mL  5-40 mL IntraVENous 2 times per day Barbara Treadwell MD        sodium chloride flush 0.9 % injection 5-40 mL  5-40 mL IntraVENous PRN Barbara Treadwell MD        0.9 % sodium chloride infusion   IntraVENous PRN Barbara Treadwell MD        ceFAZolin (ANCEF) 2,000 mg in dextrose 5 % 50 mL IVPB (mini-bag)  2,000 mg IntraVENous Once Wilbert Thao MD         Vital Signs (Current)   Vitals:    23   BP: (!) 140/75   Pulse: 81   Resp: 18   Temp: 96.9 °F (36.1 °C)   SpO2: 99%     Vital Signs Statistics (for past 48 hrs)     Temp  Av.9 °F (36.1 °C)  Min: 96.9 °F (36.1 °C)   Min taken time: 23  Max: 96.9 °F (36.1 °C)   Max taken time: 23  Pulse  Av  Min: 81   Min taken time: 2359  Max: 81   Max taken time: 23  Resp Av  Min: 25   Min taken time: 23 0859  Max: 25   Max taken time: 23 0859  BP  Min: 140/75   Min taken time: 23 0859  Max: 140/75   Max taken time: 23 0859  SpO2  Av %  Min: 99 %   Min taken time: 23 0859  Max: 99 %   Max taken time: 23 0859    BP Readings from Last 3 Encounters:   23 (!) 140/75   22 110/80   20 125/70     BMI  Body mass index is 32.82 kg/m². Estimated body mass index is 32.82 kg/m² as calculated from the following:    Height as of this encounter: 6' (1.829 m). Weight as of this encounter: 242 lb (109.8 kg). CBC   Lab Results   Component Value Date/Time    WBC 8.2 2020 08:12 PM    RBC 5.15 2020 08:12 PM    HGB 15.4 2020 08:12 PM    HCT 46.5 2020 08:12 PM    MCV 90.4 2020 08:12 PM    RDW 12.2 2020 08:12 PM     2020 08:12 PM     CMP    Lab Results   Component Value Date/Time     2020 08:12 PM    K 4.7 2020 08:12 PM     2020 08:12 PM    CO2 25 2020 08:12 PM    BUN 17 2020 08:12 PM    CREATININE 1.4 2020 08:12 PM    GFRAA >60 2020 08:12 PM    AGRATIO 1.3 2020 08:12 PM    LABGLOM 56 2020 08:12 PM    GLUCOSE 113 2020 08:12 PM    PROT 7.4 2020 08:12 PM    CALCIUM 9.4 2020 08:12 PM    BILITOT 0.7 2020 08:12 PM    ALKPHOS 61 2020 08:12 PM    AST 40 2020 08:12 PM    ALT 53 2020 08:12 PM     BMP    Lab Results   Component Value Date/Time     2020 08:12 PM    K 4.7 2020 08:12 PM     2020 08:12 PM    CO2 25 2020 08:12 PM    BUN 17 2020 08:12 PM    CREATININE 1.4 2020 08:12 PM    CALCIUM 9.4 2020 08:12 PM    GFRAA >60 2020 08:12 PM    LABGLOM 56 2020 08:12 PM    GLUCOSE 113 2020 08:12 PM     POCGlucose  No results for input(s): GLUCOSE in the last 72 hours.    Coags  No results found for: PROTIME, INR, APTT  HCG (If Applicable) No results found for: PREGTESTUR, PREGSERUM, HCG, HCGQUANT   ABGs No results found for: PHART, PO2ART, RNF8AHF, NQT5UBY, BEART, N4OCPCJL   Type & Screen (If Applicable)  No results found for: LABABO, LABRH                         BMI: Wt Readings from Last 3 Encounters:       NPO Status:   Date of last liquid consumption: 01/18/23   Time of last liquid consumption: 0000   Date of last solid food consumption: 01/18/23      Time of last solid consumption: 0000       Anesthesia Evaluation  Patient summary reviewed no history of anesthetic complications:   Airway: Mallampati: III  TM distance: >3 FB   Neck ROM: full  Mouth opening: > = 3 FB   Dental: normal exam         Pulmonary:Negative Pulmonary ROS and normal exam                               Cardiovascular:Negative CV ROS  Exercise tolerance: good (>4 METS),           Rhythm: regular  Rate: normal           Beta Blocker:  Not on Beta Blocker         Neuro/Psych:   (+) depression/anxiety             GI/Hepatic/Renal: Neg GI/Hepatic/Renal ROS       (-) GERD       Endo/Other: Negative Endo/Other ROS                    Abdominal:   (+) obese,           Vascular: negative vascular ROS.         Other Findings:           Anesthesia Plan      MAC     ASA 2       Induction: intravenous.    MIPS: Postoperative opioids intended and Prophylactic antiemetics administered.  Anesthetic plan and risks discussed with patient and spouse.      Plan discussed with CRNA.                This pre-anesthesia assessment may be used as a history and physical.    DOS STAFF ADDENDUM:    Pt seen and examined, chart reviewed (including anesthesia, drug and allergy history).  No interval changes to history and physical examination.  Anesthetic plan, risks, benefits, alternatives, and personnel involved discussed with patient. Questions and concerns addressed.  Patient(family) verbalized an understanding and agrees to proceed.      Ascencion Morocho MD  January 18, 2023  9:05  AM

## 2023-01-18 NOTE — ANESTHESIA POSTPROCEDURE EVALUATION
[facility] Department of Anesthesiology  Post-Anesthesia Note       Name:  Natanael Fry                                  Age:  38 y.o. MRN:  [id number]     Last Vitals & Oxygen Saturation: BP (!) 142/79   Pulse 72   Temp 97.6 °F (36.4 °C) (Temporal)   Resp 18   Ht 6' (1.829 m)   Wt 242 lb (109.8 kg)   SpO2 96%   BMI 32.82 kg/m²   Patient Vitals for the past 4 hrs:   BP Temp Temp src Pulse Resp SpO2 Height Weight   01/18/23 1224 (!) 142/79 97.6 °F (36.4 °C) Temporal 72 18 96 % -- --   01/18/23 1215 (!) 129/95 -- -- 79 16 95 % -- --   01/18/23 1200 129/82 -- -- 74 15 95 % -- --   01/18/23 1145 (!) 126/94 -- -- 67 24 96 % -- --   01/18/23 1135 125/82 -- -- 66 12 96 % -- --   01/18/23 1130 113/69 -- -- 66 12 96 % -- --   01/18/23 1125 114/72 -- -- 68 13 95 % -- --   01/18/23 1120 110/73 -- -- 70 12 95 % -- --   01/18/23 1115 118/61 97.4 °F (36.3 °C) -- 72 12 95 % -- --   01/18/23 0859 (!) 140/75 96.9 °F (36.1 °C) Temporal 81 18 99 % 6' (1.829 m) --   01/18/23 0840 -- -- -- -- -- -- -- 242 lb (109.8 kg)       Level of consciousness:  Awake, alert    Respiratory: Respirations easy, no distress. Stable. Cardiovascular: Hemodynamically stable. Hydration: Adequate. PONV: Adequately managed. Post-op pain: Adequately controlled. Post-op assessment: Tolerated anesthetic well without complication. Complications:  None.     Nyla Martinez MD  January 18, 2023   12:36 PM

## 2023-01-18 NOTE — DISCHARGE INSTRUCTIONS
Follow up in 2-3 weeks  Call [phone] for an appointment    Remove dressings and pain pump in 3-4 days when pump is empty  Change dressing sooner if needed    Ok to shower in AM  No Driving for 4 days. OK to drive at that time if you are not taking any pain medication. Please take Naprosyn twice a day for the next five days. Take with food to avoid stomach upset. After five days you may continue using Naprosyn as needed for ongoing pain. If you develop abdominal cramping or nausea, stop naprosyn. Please use Oxycodone as needed for additional pain control.

## 2023-01-18 NOTE — OP NOTE
[address] Jordyn Mendez [address]                                OPERATIVE REPORT    PATIENT NAME: Jessica Carlson                       :        1980  MED REC NO:   [id number]                          ROOM:  ACCOUNT NO:   [de-identified]                           ADMIT DATE: 2023  PROVIDER:     Lien Acuña MD    DATE OF PROCEDURE:  2023    PREOPERATIVE DIAGNOSIS:  Left inguinal hernia. POSTOPERATIVE DIAGNOSIS:  Left inguinal hernia. OPERATION PERFORMED:  Repair of left inguinal hernia. SURGEON:  Lien Acuña MD    SPECIMEN:  None. ESTIMATED BLOOD LOSS:  Less than 50 mL. COMPLICATIONS:  None. DISPOSITION:  To Recovery in stable condition. INDICATION:  The patient is a 49-year-old male with an increasingly  large and now painful left inguinal hernia. The risks, benefits,  alternatives of repair were reviewed and he agreed to proceed. OPERATIVE PROCEDURE:  The patient was brought to the operating room,  placed supine, sedation delivered and the left groin prepped and draped  in a sterile fashion. Local anesthetic was infused. An oblique  incision made and dissection carried through the subcutaneous tissue to  the external oblique which was opened through the external ring. Dissection was now carried down to the pubic tubercle and the spermatic  cord dissected free. There was a large hernia sac passing with the cord  and this was dissected free off the spermatic cord structures back to  the internal ring. The internal ring had widened and the hernia and its  contents were reduced and then the internal ring imbricated medial and  lateral to the spermatic cord.   Once the hernia contents were secured, a  Marlex mesh was cut to size and placed on the floor of the inguinal  canal.  This was anchored at the pubic tubercle with a 2-0 PDS and that  suture run from medial to lateral along the shelving edge of the  inguinal ligament until lateral to the internal ring. The lateral edge  of the mesh had been cut and tails brought around the internal ring and  these were now secured laterally with interrupted 2-0 PDS. Finally the  medial edge of the mesh was anchored with interrupted 2-0 PDS. At the  internal ring, a forceps could just pass through the mesh along with the  spermatic cord. An On-Q pump was placed and the oblique closed with a  2-0 Vicryl. Skin was closed with 3-0 and 4-0 Vicryl. Dressings were  applied and the patient transferred to Recovery in good condition.         uLcia Camarena MD    D: 01/18/2023 11:44:42       T: 01/18/2023 11:48:33     [email or web address]  Job#: [id number]     Doc#: [id number]    CC:

## 2023-01-18 NOTE — PROGRESS NOTES
Pt discharged to home. Transported with wheelchair. Accompanied by spouse. Transported in personal vehicle. Discharge instructions and personal belongings given to pt. Explanation of discharge medications and instructions understood by verbal statement. No questions, comments or concerns at this time.          Electronically signed by Sharyn Olmstead RN on 1/18/2023 at 2:13 PM

## 2023-01-24 ENCOUNTER — OFFICE VISIT (OUTPATIENT)
Dept: SURGERY | Age: 43
End: 2023-01-24

## 2023-01-24 DIAGNOSIS — K40.90 LEFT INGUINAL HERNIA: Primary | ICD-10-CM

## 2023-01-24 PROCEDURE — 99024 POSTOP FOLLOW-UP VISIT: CPT | Performed by: SURGERY

## 2023-01-24 NOTE — PROGRESS NOTES
Griselda Youngblood (:  1980) is a 38 y.o. male,Established patient, here for evaluation of the following chief complaint(s):  Post-Op Check (Pt is here today for a postop.)         ASSESSMENT/PLAN:  1. Left inguinal hernia    Follow up in two weeks         Subjective   SUBJECTIVE/OBJECTIVE:  HPI  Patient presents s/p repair of left inguinal hernia. Patient is one week post op. Pain level is minor and improving. Incision appearance: well healed. Post op complications: none but he was uncomfortable removing On q pump at home. Removed today without issue. Follow up with me in two weeks. Review of Systems       Objective   Physical Exam       Electronically signed by Pamela Oneal MD on 2023 at 9:42 AM        An electronic signature was used to authenticate this note.     --Pamela Oneal MD

## 2023-02-02 ENCOUNTER — OFFICE VISIT (OUTPATIENT)
Dept: SURGERY | Age: 43
End: 2023-02-02

## 2023-02-02 DIAGNOSIS — K40.90 LEFT INGUINAL HERNIA: Primary | ICD-10-CM

## 2023-02-02 PROCEDURE — 99024 POSTOP FOLLOW-UP VISIT: CPT | Performed by: SURGERY

## 2023-02-02 NOTE — PROGRESS NOTES
Keiry Cary (:  1980) is a 38 y.o. male,Established patient, here for evaluation of the following chief complaint(s):  Post-Op Check (Pt is here today for a postop visit. )         ASSESSMENT/PLAN:  1. Left inguinal hernia    Follow up with me as needed           Subjective   SUBJECTIVE/OBJECTIVE:  HPI  Patient presents s/p repair of a left inguinal hernia. Patient is two weeks post op. Pain level is minor and improving. Incision appearance: well healed. Post op complications: none. Follow up with me as needed. Review of Systems       Objective   Physical Exam       Electronically signed by Rolo Carvajal MD on 2023 at 10:21 AM        An electronic signature was used to authenticate this note.     --Rolo Carvajal MD

## (undated) DEVICE — KIT INFUS PMP 270ML 2M/HR SOAK CATH L2.5IN N NARC ON-Q

## (undated) DEVICE — SOLUTION IV IRRIG POUR BRL 0.9% SODIUM CHL 2F7124

## (undated) DEVICE — GLOVE ORANGE PI 7   MSG9070

## (undated) DEVICE — SUTURE VCRL + SZ 3-0 L27IN ABSRB UD L26MM SH 1/2 CIR VCP416H

## (undated) DEVICE — SPONGE GZ W4XL4IN COT 12 PLY TYP VII WVN C FLD DSGN STERILE

## (undated) DEVICE — PENROSE DRAIN 18 X .5" SILICONE: Brand: MEDLINE

## (undated) DEVICE — SUTURE VCRL + SZ 2-0 L27IN ABSRB CLR CT-1 1/2 CIR TAPERCUT VCP259H

## (undated) DEVICE — SUTURE PDS + SZ 2 0 L27IN ABSRB VLT L26MM CT 2 1 2 CIR PDP333H

## (undated) DEVICE — GARMENT COMPR STD FOR 17IN CALF UNIF THER FLOTRN

## (undated) DEVICE — 3M™ STERI-STRIP™ COMPOUND BENZOIN TINCTURE 40 BAGS/CARTON 4 CARTONS/CASE C1544: Brand: 3M™ STERI-STRIP™

## (undated) DEVICE — CLEANER,CAUTERY TIP,2X2",STERILE: Brand: MEDLINE

## (undated) DEVICE — 3M™ TEGADERM™ TRANSPARENT FILM DRESSING FRAME STYLE, 1626W, 4 IN X 4-3/4 IN (10 CM X 12 CM), 50/CT 4CT/CASE: Brand: 3M™ TEGADERM™

## (undated) DEVICE — MERCY HEALTH WEST TURNOVER: Brand: MEDLINE INDUSTRIES, INC.

## (undated) DEVICE — STRIP,CLOSURE,WOUND,MEDI-STRIP,1/2X4: Brand: MEDLINE

## (undated) DEVICE — SUTURE VCRL + SZ 4-0 L18IN ABSRB UD L19MM PS-2 3/8 CIR PRIM VCP496H

## (undated) DEVICE — MINOR SET UP: Brand: MEDLINE INDUSTRIES, INC.